# Patient Record
Sex: FEMALE | Race: OTHER | NOT HISPANIC OR LATINO
[De-identification: names, ages, dates, MRNs, and addresses within clinical notes are randomized per-mention and may not be internally consistent; named-entity substitution may affect disease eponyms.]

---

## 2018-04-07 ENCOUNTER — RESULT REVIEW (OUTPATIENT)
Age: 28
End: 2018-04-07

## 2019-03-27 ENCOUNTER — EMERGENCY (EMERGENCY)
Facility: HOSPITAL | Age: 29
LOS: 1 days | Discharge: HOME | End: 2019-03-27
Attending: EMERGENCY MEDICINE | Admitting: EMERGENCY MEDICINE
Payer: COMMERCIAL

## 2019-03-27 VITALS
RESPIRATION RATE: 18 BRPM | HEART RATE: 94 BPM | WEIGHT: 134.92 LBS | SYSTOLIC BLOOD PRESSURE: 119 MMHG | DIASTOLIC BLOOD PRESSURE: 85 MMHG | HEIGHT: 62 IN | TEMPERATURE: 98 F | OXYGEN SATURATION: 97 %

## 2019-03-27 VITALS
DIASTOLIC BLOOD PRESSURE: 75 MMHG | OXYGEN SATURATION: 96 % | TEMPERATURE: 98 F | RESPIRATION RATE: 16 BRPM | HEART RATE: 69 BPM | SYSTOLIC BLOOD PRESSURE: 115 MMHG

## 2019-03-27 PROCEDURE — 73562 X-RAY EXAM OF KNEE 3: CPT

## 2019-03-27 PROCEDURE — 99284 EMERGENCY DEPT VISIT MOD MDM: CPT | Mod: 25

## 2019-03-27 PROCEDURE — 99284 EMERGENCY DEPT VISIT MOD MDM: CPT

## 2019-03-27 PROCEDURE — 73562 X-RAY EXAM OF KNEE 3: CPT | Mod: 26,RT

## 2019-03-27 PROCEDURE — 73502 X-RAY EXAM HIP UNI 2-3 VIEWS: CPT | Mod: 26,RT

## 2019-03-27 PROCEDURE — 96372 THER/PROPH/DIAG INJ SC/IM: CPT

## 2019-03-27 PROCEDURE — 73502 X-RAY EXAM HIP UNI 2-3 VIEWS: CPT

## 2019-03-27 RX ORDER — KETOROLAC TROMETHAMINE 30 MG/ML
60 SYRINGE (ML) INJECTION ONCE
Qty: 0 | Refills: 0 | Status: DISCONTINUED | OUTPATIENT
Start: 2019-03-27 | End: 2019-03-27

## 2019-03-27 RX ADMIN — Medication 60 MILLIGRAM(S): at 20:43

## 2019-03-27 NOTE — ED PROVIDER NOTE - DIAGNOSTIC INTERPRETATION
R knee and R hip with pelvis xrays both without acute findings, no acute fractures or dislocation noted.

## 2019-03-27 NOTE — ED PROVIDER NOTE - ATTENDING CONTRIBUTION TO CARE
hx as per pt and PA, 29yo female who present with right knee pain for 2 weeks s/p dislocation. pt c/o chronic pain with brace, did not follow up with ortho, no tingling or weakness  exam:+anterior effusion, neg ant/post drawer, neg click, pain with flexion and extension  plan: Xr, pain meds, ortho follow up  agree with assessment and plan of PA

## 2019-03-27 NOTE — ED ADULT NURSE NOTE - NSIMPLEMENTINTERV_GEN_ALL_ED
Implemented All Fall with Harm Risk Interventions:  [place] to call system. Call bell, personal items and telephone within reach. Instruct patient to call for assistance. Room bathroom lighting operational. Non-slip footwear when patient is off stretcher. Physically safe environment: no spills, clutter or unnecessary equipment. Stretcher in lowest position, wheels locked, appropriate side rails in place. Provide visual cue, wrist band, yellow gown, etc. Monitor gait and stability. Monitor for mental status changes and reorient to person, place, and time. Review medications for side effects contributing to fall risk. Reinforce activity limits and safety measures with patient and family. Provide visual clues: red socks.

## 2019-03-27 NOTE — ED PROVIDER NOTE - PROGRESS NOTE DETAILS
C/D/W ED attending, Dr. Queen who agreed with plan. X-rays reviewed with ED attending, no acute fractures or disclocation noted. Will d/c home, advised to take motrin, f/u orthopedist this week. Pt has crutches with her. States that she has knee braces at home and is more painful, refused knee immobilizer in ED.

## 2019-03-27 NOTE — ED ADULT NURSE REASSESSMENT NOTE - NS ED NURSE REASSESS COMMENT FT1
pt seen and examined. MD and PA aware of results. pt reports some relief of pain from PRN medication. d/c to self

## 2019-03-27 NOTE — ED PROVIDER NOTE - OBJECTIVE STATEMENT
29 y/o F presents with c/o R knee pain x 2 weeks.  Pt states that she had hit her R knee on the edge of the desk at work on 3/11/19 causing her R knee to "pop out" and her to fall on the floor. States that her knee went back into place on it's own after and has been having pain since. States that she has hx of R knee dislocation last year, saw an orthopedist then and had MRI which was normal. States that she has been taking advil/motrin at home without relief of pain and has been using crutches for support. Denies taking any pain meds now. States that pain is now radiating to her R hip. Denies numbness, tingling, open wounds, other injuries/symptoms. 29 y/o F presents with c/o R knee pain x 2 weeks.  Pt states that she had hit her R knee on the edge of the desk at work on 3/11/19 causing her R knee to "pop out" and her to fall on the floor. States that her knee went back into place on it's own after and has been having pain since. States that she has hx of R knee dislocation last year, saw an orthopedist then and had MRI which was normal. States that she has been taking advil/motrin at home without relief of pain and has been using crutches for support. States that she has appt with her orthopedist on 4/4. Denies taking any pain meds today. States that pain is now radiating to her R hip. Denies numbness, tingling, open wounds, other injuries/symptoms.

## 2019-03-27 NOTE — ED ADULT TRIAGE NOTE - CHIEF COMPLAINT QUOTE
Patient reports knee gave out on 3/11/19 and she fell to ground, She felt the knee pop out of place and pop back in. Patient believed the pain would have gone away by now but hasn't. Reports this is first medical encounter for event. Ambulating with crutches

## 2019-03-27 NOTE — ED PROVIDER NOTE - CLINICAL SUMMARY MEDICAL DECISION MAKING FREE TEXT BOX
27 y/o F with R knee pain since 3/11/19 s/p dislocation of R knee sp injury at work and knee popped back into place on it's own, still c/o pain, no relief with otc nsaids, will get xray, toradol, f/u ortho.

## 2019-03-27 NOTE — ED PROVIDER NOTE - MUSCULOSKELETAL, MLM
+ttp R anterior knee with mild swelling noted, +LROM on flexion of knee secondary to pain, skin intact, toes warm & mobile, distal pulses and sensation intact, NVI; +ttp R posterior/lateral hip with FROM, no swelling/erythema/stepoffs noted.

## 2019-03-27 NOTE — ED ADULT NURSE NOTE - OBJECTIVE STATEMENT
pt reports she hit her knee on a desk on 3/11/19. she reports her knee "popped out" but went "back in on its own"

## 2019-06-10 PROBLEM — S03.00XA DISLOCATION OF JAW, UNSPECIFIED SIDE, INITIAL ENCOUNTER: Chronic | Status: ACTIVE | Noted: 2019-03-27

## 2019-06-11 PROBLEM — Z00.00 ENCOUNTER FOR PREVENTIVE HEALTH EXAMINATION: Status: ACTIVE | Noted: 2019-06-11

## 2019-06-17 ENCOUNTER — APPOINTMENT (OUTPATIENT)
Dept: ORTHOPEDIC SURGERY | Facility: CLINIC | Age: 29
End: 2019-06-17
Payer: MEDICAID

## 2019-06-17 VITALS
DIASTOLIC BLOOD PRESSURE: 71 MMHG | BODY MASS INDEX: 25.95 KG/M2 | WEIGHT: 141 LBS | HEART RATE: 67 BPM | SYSTOLIC BLOOD PRESSURE: 117 MMHG | HEIGHT: 62 IN

## 2019-06-17 DIAGNOSIS — M25.511 PAIN IN RIGHT SHOULDER: ICD-10-CM

## 2019-06-17 DIAGNOSIS — M25.562 PAIN IN LEFT KNEE: ICD-10-CM

## 2019-06-17 DIAGNOSIS — S83.004D UNSPECIFIED DISLOCATION OF RIGHT PATELLA, SUBSEQUENT ENCOUNTER: ICD-10-CM

## 2019-06-17 PROCEDURE — 99203 OFFICE O/P NEW LOW 30 MIN: CPT

## 2019-06-17 RX ORDER — UBIDECARENONE/VIT E ACET 100MG-5
CAPSULE ORAL
Refills: 0 | Status: ACTIVE | COMMUNITY

## 2019-11-22 ENCOUNTER — RESULT REVIEW (OUTPATIENT)
Age: 29
End: 2019-11-22

## 2024-05-19 NOTE — ED PROVIDER NOTE - CHIEF COMPLAINT
Lactation visit with family. Baby less than 24 hours at time. Patient had baby on chest states infant sleepy. Writer assisted with taking off clothes, changing diaper. Baby then eager to eat. Latched STS on left breast. Active sucking with swallows pointed out to parents. Reviewed importance of frequent feeds to bring in milk, watching infants cues, listening for swallows and breast compressions to keep infant enticed. Praise given for a good feed. Knows she can call for questions or assistance as needed. Has a pump for home.   The patient is a 28y Female complaining of knee pain/injury.
